# Patient Record
(demographics unavailable — no encounter records)

---

## 2025-03-21 NOTE — PHYSICAL EXAM
[Midline] : trachea located in midline position [Laryngoscopy Performed] : laryngoscopy was performed, see procedure section for findings [Normal] : no rashes [de-identified] : much improved voice, mild raspy

## 2025-03-21 NOTE — DATA REVIEWED
[de-identified] : Audiogram ordered to assess for sensorineural +/- conductive hearing loss Results: mild high frequency SNHL otherwise normal hearing AU

## 2025-03-21 NOTE — HISTORY OF PRESENT ILLNESS
[de-identified] : 60 year old male with RRP s/p excision and injection laryngoplasty 12/11/2020, 07/08/22 & 10/13/23. Presents today for follow up evaluation. States doing well. Voice is stable since last seen.  Denies throat discomfort, hemoptysis and dysphagia.  States ran out of Omeprazole 1 month ago with return of reflux symptoms.  Patient feels that tinnitus and hearing are stable. Reports intermittent right otalgia for the past 2 months. Denies otorrhea and fevers. Pt reports feeling tired and throat tightens while exercising Last audio: 9/25/24

## 2025-03-21 NOTE — CONSULT LETTER
[Dear  ___] : Dear  [unfilled], [Courtesy Letter:] : I had the pleasure of seeing your patient, [unfilled], in my office today. [Please see my note below.] : Please see my note below. [Consult Closing:] : Thank you very much for allowing me to participate in the care of this patient.  If you have any questions, please do not hesitate to contact me. [Sincerely,] : Sincerely, [FreeTextEntry3] : Chuck Bo MD, PhD\par  Chief, Division of Laryngology\par  Department of Otolaryngology\par  [facility]\par  Pediatric Otolaryngology, [facility]\par   of Otolaryngology\par  [employer]\par

## 2025-03-21 NOTE — REASON FOR VISIT
[Subsequent Evaluation] : a subsequent evaluation for [FreeTextEntry2] : GILDARDO [Interpreters_IDNumber] : [id number] [Interpreters_FullName] : Kallie [TWNoteComboBox1] : [place]

## 2025-03-21 NOTE — ADDENDUM
[FreeTextEntry1] :  Documented by Courtney Noble acting as scribe for Dr. Bo on 03/21/2025. All Medical record entries made by the Scribe were at my, Dr. Bo, direction and personally dictated by me on 03/21/2025 . I have reviewed the chart and agree that the record accurately reflects my personal performance of the history, physical exam, assessment and plan. I have also personally directed, reviewed, and agreed with the discharge instructions.